# Patient Record
Sex: FEMALE
[De-identification: names, ages, dates, MRNs, and addresses within clinical notes are randomized per-mention and may not be internally consistent; named-entity substitution may affect disease eponyms.]

---

## 2017-05-07 ENCOUNTER — HOSPITAL ENCOUNTER (EMERGENCY)
Dept: HOSPITAL 57 - BURERS | Age: 22
Discharge: HOME | End: 2017-05-07
Payer: SELF-PAY

## 2017-05-07 DIAGNOSIS — Z23: ICD-10-CM
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
## 2017-05-07 DIAGNOSIS — S51.811A: ICD-10-CM

## 2017-05-07 DIAGNOSIS — S06.0X9A: Primary | ICD-10-CM

## 2017-05-07 LAB
HCG UR QL: NEGATIVE
PREGU CONTROL BAR APPEAR?: YES

## 2017-05-07 PROCEDURE — 70450 CT HEAD/BRAIN W/O DYE: CPT

## 2017-05-07 PROCEDURE — 81025 URINE PREGNANCY TEST: CPT

## 2017-05-07 PROCEDURE — 96372 THER/PROPH/DIAG INJ SC/IM: CPT

## 2017-05-07 PROCEDURE — 12001 RPR S/N/AX/GEN/TRNK 2.5CM/<: CPT

## 2017-05-07 PROCEDURE — 90471 IMMUNIZATION ADMIN: CPT

## 2017-05-07 PROCEDURE — 90715 TDAP VACCINE 7 YRS/> IM: CPT
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
## 2019-10-18 ENCOUNTER — HOSPITAL ENCOUNTER (EMERGENCY)
Dept: HOSPITAL 92 - ERS | Age: 24
Discharge: HOME | End: 2019-10-18
Payer: COMMERCIAL
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
## 2019-10-18 DIAGNOSIS — M25.512: ICD-10-CM

## 2019-10-18 DIAGNOSIS — S16.1XXA: Primary | ICD-10-CM
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
